# Patient Record
Sex: FEMALE | URBAN - METROPOLITAN AREA
[De-identification: names, ages, dates, MRNs, and addresses within clinical notes are randomized per-mention and may not be internally consistent; named-entity substitution may affect disease eponyms.]

---

## 2017-01-04 ENCOUNTER — OFFICE VISIT (OUTPATIENT)
Dept: FAMILY MEDICINE | Facility: CLINIC | Age: 22
End: 2017-01-04
Payer: COMMERCIAL

## 2017-01-04 VITALS
TEMPERATURE: 99.4 F | HEART RATE: 73 BPM | BODY MASS INDEX: 23.15 KG/M2 | WEIGHT: 122.6 LBS | OXYGEN SATURATION: 98 % | HEIGHT: 61 IN | DIASTOLIC BLOOD PRESSURE: 64 MMHG | SYSTOLIC BLOOD PRESSURE: 103 MMHG

## 2017-01-04 DIAGNOSIS — F90.2 ATTENTION DEFICIT HYPERACTIVITY DISORDER (ADHD), COMBINED TYPE: Primary | ICD-10-CM

## 2017-01-04 DIAGNOSIS — N64.59 BREAST SYMPTOM: ICD-10-CM

## 2017-01-04 DIAGNOSIS — F33.41 RECURRENT MAJOR DEPRESSIVE DISORDER, IN PARTIAL REMISSION (H): ICD-10-CM

## 2017-01-04 DIAGNOSIS — Z30.8 ENCOUNTER FOR OTHER CONTRACEPTIVE MANAGEMENT: ICD-10-CM

## 2017-01-04 DIAGNOSIS — F41.1 GENERALIZED ANXIETY DISORDER: ICD-10-CM

## 2017-01-04 DIAGNOSIS — R48.0 DYSLEXIA AND ALEXIA: ICD-10-CM

## 2017-01-04 PROCEDURE — 99385 PREV VISIT NEW AGE 18-39: CPT | Performed by: INTERNAL MEDICINE

## 2017-01-04 RX ORDER — NAPROXEN 500 MG/1
500 TABLET ORAL DAILY PRN
Refills: 0 | COMMUNITY
Start: 2016-11-04

## 2017-01-04 RX ORDER — FLUOXETINE 20 MG/1
20 TABLET, FILM COATED ORAL DAILY
COMMUNITY
Start: 2016-08-17 | End: 2017-08-17

## 2017-01-04 RX ORDER — LEVONORGESTREL AND ETHINYL ESTRADIOL 0.1-0.02MG
1 KIT ORAL DAILY
Qty: 28 TABLET | Refills: 2 | Status: SHIPPED | OUTPATIENT
Start: 2017-01-04

## 2017-01-04 RX ORDER — BUPROPION HYDROCHLORIDE 300 MG/1
300 TABLET ORAL DAILY
COMMUNITY
Start: 2016-08-17 | End: 2017-08-17

## 2017-01-04 RX ORDER — LEVONORGESTREL AND ETHINYL ESTRADIOL 0.1-0.02MG
1 KIT ORAL DAILY
Refills: 2 | COMMUNITY
Start: 2016-12-13 | End: 2017-01-04

## 2017-01-04 ASSESSMENT — ANXIETY QUESTIONNAIRES
3. WORRYING TOO MUCH ABOUT DIFFERENT THINGS: NOT AT ALL
6. BECOMING EASILY ANNOYED OR IRRITABLE: NOT AT ALL
7. FEELING AFRAID AS IF SOMETHING AWFUL MIGHT HAPPEN: NOT AT ALL
1. FEELING NERVOUS, ANXIOUS, OR ON EDGE: NOT AT ALL
GAD7 TOTAL SCORE: 0
IF YOU CHECKED OFF ANY PROBLEMS ON THIS QUESTIONNAIRE, HOW DIFFICULT HAVE THESE PROBLEMS MADE IT FOR YOU TO DO YOUR WORK, TAKE CARE OF THINGS AT HOME, OR GET ALONG WITH OTHER PEOPLE: NOT DIFFICULT AT ALL
5. BEING SO RESTLESS THAT IT IS HARD TO SIT STILL: NOT AT ALL
2. NOT BEING ABLE TO STOP OR CONTROL WORRYING: NOT AT ALL

## 2017-01-04 ASSESSMENT — PATIENT HEALTH QUESTIONNAIRE - PHQ9: 5. POOR APPETITE OR OVEREATING: NOT AT ALL

## 2017-01-04 NOTE — NURSING NOTE
"Chief Complaint   Patient presents with     Establish Care       Initial /64 mmHg  Pulse 73  Temp(Src) 99.4  F (37.4  C) (Oral)  Ht 5' 0.5\" (1.537 m)  Wt 122 lb 9.6 oz (55.611 kg)  BMI 23.54 kg/m2  SpO2 98%  LMP 12/22/2016 (Exact Date) Estimated body mass index is 23.54 kg/(m^2) as calculated from the following:    Height as of this encounter: 5' 0.5\" (1.537 m).    Weight as of this encounter: 122 lb 9.6 oz (55.611 kg).  BP completed using cuff size: regular, left arm  Amina Maddox MA  "

## 2017-01-04 NOTE — MR AVS SNAPSHOT
"              After Visit Summary   1/4/2017    Chyna Fierro    MRN: [id number]           Patient Information     Date Of Birth          1995        Visit Information        Provider Department      1/4/2017 1:30 PM Choco Aguirre MD [facility]        Today's Diagnoses     Attention deficit hyperactivity disorder (ADHD), combined type    -  1     Dyslexia and alexia         Generalized anxiety disorder         Recurrent major depressive disorder, in partial remission (H)         Encounter for other contraceptive management            Follow-ups after your visit        Follow-up notes from your care team     Return in about 1 year (around 1/4/2018) for Physical Exam.      Who to contact     If you have questions or need follow up information about today's clinic visit or your schedule please contact [facility] directly at [phone].  Normal or non-critical lab and imaging results will be communicated to you by MyChart, letter or phone within 4 business days after the clinic has received the results. If you do not hear from us within 7 days, please contact the clinic through MyChart or phone. If you have a critical or abnormal lab result, we will notify you by phone as soon as possible.  Submit refill requests through [employer] or call your pharmacy and they will forward the refill request to us. Please allow 3 business days for your refill to be completed.          Additional Information About Your Visit        MyChart Information     [employer] lets you send messages to your doctor, view your test results, renew your prescriptions, schedule appointments and more. To sign up, go to [email or web address] . Click on \"Log in\" on the left side of the screen, which will take you to the Welcome page. Then click on \"Sign up Now\" on the right side of the page.     You will be asked to enter the access code listed below, as well as some personal information. Please follow the directions to " "create your username and password.     Your access code is: [email or web address]  Expires: 2017  2:11 PM     Your access code will  in 90 days. If you need help or a new code, please call your [place] clinic or [phone].        Care EveryWhere ID     This is your Care EveryWhere ID. This could be used by other organizations to access your [place] medical records  [id number]        Your Vitals Were     Pulse Temperature Height BMI (Body Mass Index) Pulse Oximetry Last Period    73 99.4  F (37.4  C) (Oral) 5' 0.5\" (1.537 m) 23.54 kg/m2 98% 2016 (Exact Date)       Blood Pressure from Last 3 Encounters:   17 103/64   12 100/67    Weight from Last 3 Encounters:   17 122 lb 9.6 oz (55.611 kg) (39.46 %*)     * Growth percentiles are based on [facility] 2-20 Years data.              We Performed the Following     DEPRESSION ACTION PLAN (DAP)          Where to get your medicines      These medications were sent to [id number] [place] - [address] AT [facility] [address]  [facility] [address]     Phone:  [phone]    - ORSYTHIA 0.1-20 MG-MCG per tablet       Primary Care Provider Office Phone # Fax #    Choco Aguirre [phone]       [facility] [address] [place] [address]        Thank you!     Thank you for choosing [facility]  for your care. Our goal is always to provide you with excellent care. Hearing back from our patients is one way we can continue to improve our services. Please take a few minutes to complete the written survey that you may receive in the mail after your visit with us. Thank you!             Your Updated Medication List - Protect others around you: Learn how to safely use, store and throw away your medicines at www.disposemymeds.org.          This list is accurate as of: 17  2:11 PM.  Always use your most recent med list.                   Brand Name Dispense Instructions " for use    buPROPion 300 MG 24 hr tablet    WELLBUTRIN XL     Take 300 mg by mouth daily       cholecalciferol 1000 UNITS capsule    vitamin  -D     Take 1 capsule by mouth daily       CONCERTA 27 MG CR tablet   Generic drug:  methylphenidate ER      Take 27 mg by mouth every morning.       FLUoxetine 20 MG tablet      Take 20 mg by mouth daily       naproxen 500 MG tablet    NAPROSYN     Take 500 mg by mouth daily as needed       ORSYTHIA 0.1-20 MG-MCG per tablet   Generic drug:  levonorgestrel-ethinyl estradiol     28 tablet    Take 1 tablet by mouth daily

## 2017-01-04 NOTE — PROGRESS NOTES
SUBJECTIVE:                                                    Chyna Fierro is a 21 year old female who presents to clinic today for the following health issues:      New Patient/Transfer of Care    This is a pleasant 21-year-old female with history of depression and anxiety as well as attention deficit disorder who is on oral contraceptive medications to help regulate her periods as well as prevent pregnancy. She is sexually active with a monogamous male partner. She denies any specific complaints.      Depression and Anxiety Follow-Up    Status since last visit: No change    Other associated symptoms:None    Complicating factors:     Significant life event: No     Current substance abuse: None    PHQ-9 SCORE 1/4/2017 1/4/2017 1/4/2017   Total Score 0 0 0     AMIRA-7 SCORE 1/4/2017   Total Score 0        PHQ-9  English      PHQ-9   Any Language     GAD7         Amount of exercise or physical activity: 30 minutes daily    Problems taking medications regularly: No    Medication side effects: none  Diet: regular (no restrictions)       Problem list and histories reviewed & adjusted, as indicated.  Additional history: as documented    Patient Active Problem List   Diagnosis     Attention deficit hyperactivity disorder (ADHD), combined type     Dyslexia and alexia     Generalized anxiety disorder     Recurrent major depressive disorder, in partial remission (H)     Past Surgical History   Procedure Laterality Date     Dental surgery       [place] tooth removal       Social History   Substance Use Topics     Smoking status: Current Some Day Smoker     Smokeless tobacco: Not on file     Alcohol Use: No     Family History   Problem Relation Age of Onset     Unknown/Adopted           Current Outpatient Prescriptions   Medication Sig Dispense Refill     FLUoxetine 20 MG tablet Take 20 mg by mouth daily       cholecalciferol (VITAMIN  -D) 1000 UNITS capsule Take 1 capsule by mouth daily       naproxen (NAPROSYN) 500 MG  "tablet Take 500 mg by mouth daily as needed  0     buPROPion (WELLBUTRIN XL) 300 MG 24 hr tablet Take 300 mg by mouth daily       ORSYTHIA 0.1-20 MG-MCG per tablet Take 1 tablet by mouth daily 28 tablet 2     methylphenidate (CONCERTA) 27 MG CR tablet Take 27 mg by mouth every morning.       [DISCONTINUED] ORSYTHIA 0.1-20 MG-MCG per tablet Take 1 tablet by mouth daily  2     Allergies   Allergen Reactions     No Known Allergies        ROS:  She describes tenderness on the lateral aspects of her bilateral breast that has been present for 3 days without specific breast masses or lumps noted by her. There has been no nipple discharge or galactorrhea.  Constitutional, HEENT, cardiovascular, pulmonary, GI, , musculoskeletal, neuro, skin, endocrine and psych systems are negative, except as otherwise noted.    OBJECTIVE:                                                    /64 mmHg  Pulse 73  Temp(Src) 99.4  F (37.4  C) (Oral)  Ht 5' 0.5\" (1.537 m)  Wt 122 lb 9.6 oz (55.611 kg)  BMI 23.54 kg/m2  SpO2 98%  LMP 12/22/2016 (Exact Date)  Body mass index is 23.54 kg/(m^2).  GENERAL: healthy, alert and no distress  EYES: Eyes grossly normal to inspection, PERRL and conjunctivae and sclerae normal  HENT: ear canals and TM's normal, nose and mouth without ulcers or lesions  NECK: no adenopathy, no asymmetry, masses, or scars and thyroid normal to palpation  RESP: lungs clear to auscultation - no rales, rhonchi or wheezes  BREAST: normal without masses, tenderness or nipple discharge and no palpable axillary masses or adenopathy (examined with female medical assistant  present)  CV: regular rate and rhythm, normal S1 S2, no S3 or S4, no murmur, click or rub, no peripheral edema and peripheral pulses strong  ABDOMEN: soft, nontender, no hepatosplenomegaly, no masses and bowel sounds normal  MS: no gross musculoskeletal defects noted, no edema  SKIN: no suspicious lesions or rashes  NEURO: Normal strength and tone, " mentation intact and speech normal  PSYCH: mentation appears normal, affect normal/bright    Diagnostic Test Results:  none      ASSESSMENT/PLAN:                                                            1. Encounter for other contraceptive management  Continue oral contraception, counseled on tobacco cessation and risk of tobacco use while taking oral contraceptive  - ORSYTHIA 0.1-20 MG-MCG per tablet; Take 1 tablet by mouth daily  Dispense: 28 tablet; Refill: 2    2. Breast symptom  There are no specific breast masses on exam, I suspect that her symptoms may be chest wall related, we agreed on a trial of an over-the-counter nonsteroidal anti-inflammatory drug taken regularly with food over the next 5 days, if her symptoms fail to respond to this intervention, then she will contact me next week by my chart and we'll arrange for breast imaging    3. Attention deficit hyperactivity disorder (ADHD), combined type  This is managed by her psychiatrist    4. Dyslexia and alexia      5. Generalized anxiety disorder  This is well controlled and managed by her psychiatrist    6. Recurrent major depressive disorder, in partial remission (H)  This is well controlled and managed by her psychiatrist      FUTURE APPOINTMENTS:  As above or in one year pending symptoms    Choco Aguirre MD  [facility]

## 2017-01-05 ASSESSMENT — PATIENT HEALTH QUESTIONNAIRE - PHQ9: SUM OF ALL RESPONSES TO PHQ QUESTIONS 1-9: 0

## 2017-01-05 ASSESSMENT — ANXIETY QUESTIONNAIRES: GAD7 TOTAL SCORE: 0

## 2018-06-17 ENCOUNTER — HEALTH MAINTENANCE LETTER (OUTPATIENT)
Age: 23
End: 2018-06-17